# Patient Record
Sex: FEMALE | Race: WHITE | NOT HISPANIC OR LATINO | Employment: FULL TIME | URBAN - METROPOLITAN AREA
[De-identification: names, ages, dates, MRNs, and addresses within clinical notes are randomized per-mention and may not be internally consistent; named-entity substitution may affect disease eponyms.]

---

## 2023-11-29 ENCOUNTER — OFFICE VISIT (OUTPATIENT)
Dept: INTERNAL MEDICINE | Facility: CLINIC | Age: 33
End: 2023-11-29
Payer: COMMERCIAL

## 2023-11-29 VITALS
DIASTOLIC BLOOD PRESSURE: 74 MMHG | SYSTOLIC BLOOD PRESSURE: 102 MMHG | WEIGHT: 171 LBS | HEIGHT: 61 IN | BODY MASS INDEX: 32.28 KG/M2 | OXYGEN SATURATION: 96 % | TEMPERATURE: 98.4 F | HEART RATE: 65 BPM

## 2023-11-29 DIAGNOSIS — Z00.00 ANNUAL PHYSICAL EXAM: Primary | ICD-10-CM

## 2023-11-29 DIAGNOSIS — F41.9 ANXIETY: ICD-10-CM

## 2023-11-29 DIAGNOSIS — R79.89 ELEVATED TSH: ICD-10-CM

## 2023-11-29 DIAGNOSIS — Z83.438 FAMILY HISTORY OF HYPERLIPIDEMIA: ICD-10-CM

## 2023-11-29 DIAGNOSIS — R10.13 DYSPEPSIA: ICD-10-CM

## 2023-11-29 NOTE — PROGRESS NOTES
Chief Complaint  Establish Care and Annual Exam    Subjective        Ivonne Cross presents to [facility] PRIMARY [facility]  History of Present Illness  Here to establish care and for annual exam.    Last annual exam we have in EHR is from June 20, 2021 where she was followingDr. Loi Mac, primary care provider, with [facility].  She reported some anxiety and insomnia.  She was prescribed hydroxyzine (Atarax) 25 mg p.o. nightly as needed.  Last lab work we have in the EHR is from June 28, 2021.  TSH was elevated, at 6.080, lipid panel showed only a mild increase in the LDL cholesterol, at 111 mg/dL, and her CBC and CMP were unremarkable.    Anxiety: She describes herself as very type-A, was very anxious at her last office visit in 2021. Feels that she is in a good spot right now. Sleeps well most nights, but recently has had intermittent difficulty with sleeping along with unspecified abdominal discomfort and diarrhea.  She vomited once or twice weeks ago and her bowel pattern has returned to normal. She and her  are trying to have a baby, and has been trying for almost 1 year. She stopped taking oral contraceptives in January 2023 and this has been stressful for her and her .    Obesity: family history of obesity with several family members, especially women, who have had weight-loss surgery without a lot of benefence. She works for [employer] and now works out at [employer] 3-5 times per week, watching her diet. She has tried kcal tracking on an virgilio, but always felt hungry. Her highest weight has been 180 lbs. She has gained weight in the past 2 years due to working on her Masters degree. Was very sedentary during that time.  When her TSH came back elevated in June 2021, it was never rechecked and she did not have further evaluation for hypothyroid disease.She denies any unusual fatigue, hair loss, skin changes.    She is up-to-date on her Pap, sees her Jacqueline Kimbrough, with total  "woman.  Last well woman exam was November 27, 2023    She reports a family history of chronic obstructive pulmonary disease (mother), hyperlipidemia (father and paternal grandmother).       Objective   Vital Signs:  /74 (BP Location: Left arm, Patient Position: Sitting, Cuff Size: Adult)   Pulse 65   Temp 98.4 °F (36.9 °C) (Infrared)   Ht 154.9 cm (61\")   Wt 77.6 kg (171 lb)   SpO2 96%   BMI 32.31 kg/m²   Estimated body mass index is 32.31 kg/m² as calculated from the following:    Height as of this encounter: 154.9 cm (61\").    Weight as of this encounter: 77.6 kg (171 lb).       BMI is >= 30 and <35. (Class 1 Obesity). The following options were offered after discussion;: exercise counseling/recommendations, nutrition counseling/recommendations, pharmacological intervention options, and referral to a nutritionist      Physical Exam  Vitals reviewed.   Constitutional:       General: She is not in acute distress.     Appearance: Normal appearance. She is obese. She is not ill-appearing, toxic-appearing or diaphoretic.   HENT:      Head: Normocephalic and atraumatic. Hair is normal.      Right Ear: Tympanic membrane, ear canal and external ear normal.      Left Ear: Tympanic membrane, ear canal and external ear normal.      Nose: Nose normal.      Mouth/Throat:      Mouth: Mucous membranes are moist.      Pharynx: Oropharynx is clear.   Eyes:      Extraocular Movements: Extraocular movements intact.      Conjunctiva/sclera: Conjunctivae normal.      Pupils: Pupils are equal, round, and reactive to light.   Cardiovascular:      Rate and Rhythm: Normal rate and regular rhythm.      Pulses: Normal pulses.      Heart sounds: Normal heart sounds.   Pulmonary:      Effort: Pulmonary effort is normal.   Abdominal:      General: Bowel sounds are normal.      Palpations: Abdomen is soft.      Tenderness: There is no abdominal tenderness.   Musculoskeletal:         General: Normal range of motion.      Cervical " back: Normal range of motion and neck supple. No tenderness.      Right lower leg: No edema.      Left lower leg: No edema.   Lymphadenopathy:      Cervical: No cervical adenopathy.   Skin:     General: Skin is warm.      Capillary Refill: Capillary refill takes less than 2 seconds.   Neurological:      General: No focal deficit present.      Mental Status: She is alert and oriented to person, place, and time. Mental status is at baseline.      Motor: No weakness.      Coordination: Coordination normal.      Gait: Gait normal.      Deep Tendon Reflexes: Reflexes normal.   Psychiatric:         Mood and Affect: Mood normal.         Behavior: Behavior normal.         Thought Content: Thought content normal.         Judgment: Judgment normal.        Result Review :                   Assessment and Plan   Patient is a very pleasant 32-year-old female with no prior remarkable medical history here to establish care and for annual exam.    Diagnoses and all orders for this visit:    1. Annual physical exam (Primary)  -     CBC & Differential  -     Comprehensive Metabolic Panel    2. Family history of hyperlipidemia  -     Lipid Panel    3. Elevated TSH  -     Thyroid Panel With TSH    4. BMI 32.0-32.9,adult  -     Hemoglobin A1c  Discussed various weight loss strategies, including medicinal therapy.  She wants to lose weight with lifestyle modifications before considering medicinal therapy.  She is also trying to get pregnant and does not want to start any medication for weight loss at this time.  We discussed the mechanisms of Wegovy, pros and cons.  Also discussed the Mediterranean diet and signed paper for [employer] employee health-incentive program, [employer]. We will continue to monitor her progress.   5. Anxiety  She feels she is in a pretty good place with anxiety.  She does not wish to start any type of medication, but she does know that if anxiety feels that is not well-controlled can discuss options in the future  including medicinal and cognitive behavioral therapy.  Because she is trying to get pregnant, advised her to keep an open communication with her obstetrician if while pregnant she does have exacerbation of her anxiety.  6. Dyspepsia  This is a recent issue within the last 2 weeks, intermittent. We will continue to monitor for chronicity. If lab work indicates liver issue, will have her follow up for further lab work and possible referral to GI.            Follow Up   Return in about 1 year (around 11/29/2024) for Annual physical.  Patient was given instructions and counseling regarding her condition or for health maintenance advice. Please see specific information pulled into the AVS if appropriate.

## 2023-11-30 ENCOUNTER — PATIENT ROUNDING (BHMG ONLY) (OUTPATIENT)
Dept: INTERNAL MEDICINE | Facility: CLINIC | Age: 33
End: 2023-11-30
Payer: COMMERCIAL

## 2023-11-30 LAB
ALBUMIN SERPL-MCNC: 4.3 G/DL (ref 3.9–4.9)
ALBUMIN/GLOB SERPL: 1.5 {RATIO} (ref 1.2–2.2)
ALP SERPL-CCNC: 113 IU/L (ref 44–121)
ALT SERPL-CCNC: 17 IU/L (ref 0–32)
AST SERPL-CCNC: 17 IU/L (ref 0–40)
BASOPHILS # BLD AUTO: 0.1 X10E3/UL (ref 0–0.2)
BASOPHILS NFR BLD AUTO: 1 %
BILIRUB SERPL-MCNC: 0.2 MG/DL (ref 0–1.2)
BUN SERPL-MCNC: 11 MG/DL (ref 6–20)
BUN/CREAT SERPL: 15 (ref 9–23)
CALCIUM SERPL-MCNC: 9.2 MG/DL (ref 8.7–10.2)
CHLORIDE SERPL-SCNC: 104 MMOL/L (ref 96–106)
CHOLEST SERPL-MCNC: 157 MG/DL (ref 100–199)
CO2 SERPL-SCNC: 22 MMOL/L (ref 20–29)
CREAT SERPL-MCNC: 0.75 MG/DL (ref 0.57–1)
EGFRCR SERPLBLD CKD-EPI 2021: 109 ML/MIN/1.73
EOSINOPHIL # BLD AUTO: 0.2 X10E3/UL (ref 0–0.4)
EOSINOPHIL NFR BLD AUTO: 2 %
ERYTHROCYTE [DISTWIDTH] IN BLOOD BY AUTOMATED COUNT: 11.9 % (ref 11.7–15.4)
FT4I SERPL CALC-MCNC: 1.6 (ref 1.2–4.9)
GLOBULIN SER CALC-MCNC: 2.8 G/DL (ref 1.5–4.5)
GLUCOSE SERPL-MCNC: 87 MG/DL (ref 70–99)
HBA1C MFR BLD: 5.7 % (ref 4.8–5.6)
HCT VFR BLD AUTO: 41.5 % (ref 34–46.6)
HDLC SERPL-MCNC: 53 MG/DL
HGB BLD-MCNC: 13.9 G/DL (ref 11.1–15.9)
IMM GRANULOCYTES # BLD AUTO: 0 X10E3/UL (ref 0–0.1)
IMM GRANULOCYTES NFR BLD AUTO: 0 %
LDLC SERPL CALC-MCNC: 91 MG/DL (ref 0–99)
LYMPHOCYTES # BLD AUTO: 2.5 X10E3/UL (ref 0.7–3.1)
LYMPHOCYTES NFR BLD AUTO: 36 %
MCH RBC QN AUTO: 29 PG (ref 26.6–33)
MCHC RBC AUTO-ENTMCNC: 33.5 G/DL (ref 31.5–35.7)
MCV RBC AUTO: 87 FL (ref 79–97)
MONOCYTES # BLD AUTO: 0.6 X10E3/UL (ref 0.1–0.9)
MONOCYTES NFR BLD AUTO: 8 %
NEUTROPHILS # BLD AUTO: 3.8 X10E3/UL (ref 1.4–7)
NEUTROPHILS NFR BLD AUTO: 53 %
PLATELET # BLD AUTO: 286 X10E3/UL (ref 150–450)
POTASSIUM SERPL-SCNC: 3.9 MMOL/L (ref 3.5–5.2)
PROT SERPL-MCNC: 7.1 G/DL (ref 6–8.5)
RBC # BLD AUTO: 4.8 X10E6/UL (ref 3.77–5.28)
SODIUM SERPL-SCNC: 141 MMOL/L (ref 134–144)
T3RU NFR SERPL: 24 % (ref 24–39)
T4 SERPL-MCNC: 6.8 UG/DL (ref 4.5–12)
TRIGL SERPL-MCNC: 64 MG/DL (ref 0–149)
TSH SERPL DL<=0.005 MIU/L-ACNC: 3.69 UIU/ML (ref 0.45–4.5)
VLDLC SERPL CALC-MCNC: 13 MG/DL (ref 5–40)
WBC # BLD AUTO: 7.1 X10E3/UL (ref 3.4–10.8)

## 2024-02-21 ENCOUNTER — OFFICE VISIT (OUTPATIENT)
Dept: INTERNAL MEDICINE | Facility: CLINIC | Age: 34
End: 2024-02-21
Payer: COMMERCIAL

## 2024-02-21 VITALS
WEIGHT: 173 LBS | DIASTOLIC BLOOD PRESSURE: 74 MMHG | SYSTOLIC BLOOD PRESSURE: 120 MMHG | RESPIRATION RATE: 16 BRPM | OXYGEN SATURATION: 99 % | TEMPERATURE: 97.8 F | HEIGHT: 60 IN | HEART RATE: 71 BPM | BODY MASS INDEX: 33.96 KG/M2

## 2024-02-21 DIAGNOSIS — Z32.00 ENCOUNTER FOR PREGNANCY TEST, RESULT UNKNOWN: ICD-10-CM

## 2024-02-21 DIAGNOSIS — H61.23 IMPACTED CERUMEN OF BOTH EARS: ICD-10-CM

## 2024-02-21 DIAGNOSIS — R05.3 CHRONIC COUGH: Primary | ICD-10-CM

## 2024-02-21 LAB
B-HCG UR QL: NEGATIVE
EXPIRATION DATE: NORMAL
INTERNAL NEGATIVE CONTROL: NORMAL
INTERNAL POSITIVE CONTROL: NORMAL
Lab: NORMAL

## 2024-02-21 RX ORDER — DOXYCYCLINE HYCLATE 100 MG/1
100 CAPSULE ORAL 2 TIMES DAILY
Qty: 14 CAPSULE | Refills: 0 | Status: SHIPPED | OUTPATIENT
Start: 2024-02-21 | End: 2024-02-28

## 2024-02-21 RX ORDER — METHYLPREDNISOLONE 4 MG/1
TABLET ORAL
Qty: 21 TABLET | Refills: 0 | Status: SHIPPED | OUTPATIENT
Start: 2024-02-21

## 2024-02-21 NOTE — PROGRESS NOTES
"Chief Complaint  Cough (Pt was sick in January, and has had a cough for about 3 weeks. Pt states that she just wanted someone to listen to her lungs to make sure no fluid on lungs. No productive, pt states it is a dry cough.)    Subjective        Ivonne Cross presents to [facility]  History of Present Illness  She was last seen in our office on November 29, 2023 to establish care and for annual physical exam.    She has been having a linger cough for about 2 months, dry, intermittent. Not associated with physical activity. She has tried Mucinex, not helping. Her grandmother was diagnosed with a rhinovirus, and was around her quite a bit. Grandmother was hospitalized for viral URI.      She is trying to get pregnant and is due her period at this time. She has never been diagnosed with asthma, she has never been a smoker. But, she is exposed to second-hand smoke via her  and his mother.  smokes outside, but does not necessarily wear a cover to decrease allergens brought inside. No one at home has tested positive for COVID-19.    No acid reflux, no post nasal drainage, no dysphagia or chronic throat clearing.           Cough  This is a new problem. The current episode started more than 1 month ago. The problem has been unchanged. The problem occurs intermittent. The cough is Dry. Pertinent negatives include no chest pain, chills, ear pain, fever, heartburn, hemoptysis, nasal congestion, postnasal drip, rhinorrhea, shortness of breath, sweats or weight loss. The symptoms are aggravated by stress. The treatment provided no relief. seasonal allergies       Objective   Vital Signs:  /74 (BP Location: Right arm, Patient Position: Sitting, Cuff Size: Adult)   Pulse 71   Temp 97.8 °F (36.6 °C) (Infrared)   Resp 16   Ht 152.4 cm (60\")   Wt 78.5 kg (173 lb)   SpO2 99%   BMI 33.79 kg/m²   Estimated body mass index is 33.79 kg/m² as calculated from the following:    " "Height as of this encounter: 152.4 cm (60\").    Weight as of this encounter: 78.5 kg (173 lb).               Physical Exam  Vitals reviewed.   Constitutional:       General: She is not in acute distress.     Appearance: Normal appearance. She is obese. She is not ill-appearing, toxic-appearing or diaphoretic.   HENT:      Right Ear: Ear canal and external ear normal. There is impacted cerumen.      Left Ear: Ear canal and external ear normal. There is impacted cerumen.      Mouth/Throat:      Mouth: Mucous membranes are moist.      Pharynx: Oropharynx is clear. No oropharyngeal exudate or posterior oropharyngeal erythema.   Cardiovascular:      Rate and Rhythm: Normal rate and regular rhythm.      Pulses: Normal pulses.      Heart sounds: Normal heart sounds.   Pulmonary:      Effort: Pulmonary effort is normal.      Breath sounds: Normal breath sounds.   Skin:     General: Skin is warm and dry.   Neurological:      General: No focal deficit present.      Mental Status: She is alert and oriented to person, place, and time. Mental status is at baseline.   Psychiatric:         Mood and Affect: Mood normal.         Behavior: Behavior normal.         Thought Content: Thought content normal.         Judgment: Judgment normal.        Result Review :                   Assessment and Plan   Patient is a very pleasant 33-year-old female with no prior remarkable medical history here with complaint chronic cough for about 2 months, stable but lingering and not responding to OTC medication.     Results for orders placed or performed in visit on 02/21/24   POC Pregnancy, Urine    Specimen: Urine   Result Value Ref Range    HCG, Urine, QL Negative Negative    Lot Number [id number]     Internal Positive Control Passed Positive, Passed    Internal Negative Control Passed Negative, Passed    Expiration Date 12,142,024              Diagnoses and all orders for this visit:    1. Chronic cough (Primary)  Comments:  If no response to " medication, will order chest x-ray with possible PFTs this continues. She does not wish to have an inhaler right now.  Orders:  -     doxycycline (VIBRAMYCIN) 100 MG capsule; Take 1 capsule by mouth 2 (Two) Times a Day for 7 days.  Dispense: 14 capsule; Refill: 0  -     methylPREDNISolone (MEDROL) 4 MG dose pack; Take as directed on package instructions.  Dispense: 21 tablet; Refill: 0    2. Encounter for pregnancy test, result unknown  Comments:  Negative  Orders:  -     POC Pregnancy, Urine    3. Impacted cerumen of both ears  Comments:  Use over-the-counter ear wax removal kit.             Follow Up   Return if symptoms worsen or fail to improve.  Patient was given instructions and counseling regarding her condition or for health maintenance advice. Please see specific information pulled into the AVS if appropriate.         Answers submitted by the patient for this visit:  Primary Reason for Visit (Submitted on 2/19/2024)  What is the primary reason for your visit?: Cough

## 2024-09-30 ENCOUNTER — OFFICE VISIT (OUTPATIENT)
Dept: INTERNAL MEDICINE | Facility: CLINIC | Age: 34
End: 2024-09-30
Payer: COMMERCIAL

## 2024-09-30 VITALS
WEIGHT: 191 LBS | HEART RATE: 91 BPM | OXYGEN SATURATION: 98 % | DIASTOLIC BLOOD PRESSURE: 76 MMHG | SYSTOLIC BLOOD PRESSURE: 120 MMHG | BODY MASS INDEX: 37.5 KG/M2 | HEIGHT: 60 IN

## 2024-09-30 DIAGNOSIS — Z00.8 ENCOUNTER FOR BIOMETRIC SCREENING: Primary | ICD-10-CM

## 2024-09-30 PROCEDURE — 99213 OFFICE O/P EST LOW 20 MIN: CPT | Performed by: NURSE PRACTITIONER

## 2024-09-30 RX ORDER — LORATADINE 10 MG/1
TABLET ORAL
COMMUNITY

## 2024-09-30 RX ORDER — ASPIRIN 81 MG/1
81 TABLET ORAL DAILY
COMMUNITY
Start: 2024-04-11 | End: 2025-04-11

## 2024-09-30 NOTE — PROGRESS NOTES
"Chief Complaint  Biometric Screening (Biometric screening for employer. /)    Subjective        Ivonne Cross presents to [facility]  History of Present Illness  She was seen in our office on 11/29/2023 for routine physical exam.     She is currently 35 weeks gestation. Does not report any concerning symptoms. Paperwork provided to complete when lab results are known to be signed and faxed to [facility] for routine wellness screening.       Objective   Vital Signs:  /76   Pulse 91   Ht 152.4 cm (60\")   Wt 86.6 kg (191 lb)   SpO2 98%   BMI 37.30 kg/m²   Estimated body mass index is 37.3 kg/m² as calculated from the following:    Height as of this encounter: 152.4 cm (60\").    Weight as of this encounter: 86.6 kg (191 lb).               Physical Exam  Vitals and nursing note reviewed.   Constitutional:       General: She is not in acute distress.     Appearance: Normal appearance. She is not ill-appearing, toxic-appearing or diaphoretic.   Cardiovascular:      Rate and Rhythm: Normal rate and regular rhythm.      Pulses: Normal pulses.      Heart sounds: Normal heart sounds.   Pulmonary:      Effort: Pulmonary effort is normal.      Breath sounds: Normal breath sounds.   Neurological:      General: No focal deficit present.      Mental Status: She is alert and oriented to person, place, and time. Mental status is at baseline.   Psychiatric:         Mood and Affect: Mood normal.         Behavior: Behavior normal.         Thought Content: Thought content normal.         Judgment: Judgment normal.        Result Review :                   Assessment and Plan   Patient is a very pleasant 33-year-old female with no prior remarkable medical history her for biometric lab work and screening for routine surveillance for employer, currently 38 week pregnant.           Diagnoses and all orders for this visit:    1. Encounter for biometric screening (Primary)  -     Hemoglobin A1c  -     Lipid " Panel    Follow up post partum after released from women's health provider.          Follow Up   No follow-ups on file.  Patient was given instructions and counseling regarding her condition or for health maintenance advice. Please see specific information pulled into the AVS if appropriate.

## 2024-10-01 LAB
CHOLEST SERPL-MCNC: 304 MG/DL (ref 100–199)
HBA1C MFR BLD: 5.8 % (ref 4.8–5.6)
HDLC SERPL-MCNC: 86 MG/DL
LDL CALC COMMENT:: ABNORMAL
LDLC SERPL CALC-MCNC: 158 MG/DL (ref 0–99)
TRIGL SERPL-MCNC: 326 MG/DL (ref 0–149)
VLDLC SERPL CALC-MCNC: 60 MG/DL (ref 5–40)

## 2024-11-07 ENCOUNTER — TELEPHONE (OUTPATIENT)
Dept: INTERNAL MEDICINE | Facility: CLINIC | Age: 34
End: 2024-11-07

## 2024-11-07 NOTE — TELEPHONE ENCOUNTER
Caller: Ivonne Cross    Relationship: Self    Best call back number: [phone]     What form or medical record are you requesting: BIOMETRICS RESULT FORM    Who is requesting this form or medical record from you: [facility]    How would you like to receive the form or medical records (pick-up, mail, fax): FAX  If fax, what is the fax number: [phone]    Timeframe paperwork needed: ASAP    Additional notes: PLEASE REFAX

## 2024-11-20 ENCOUNTER — TELEPHONE (OUTPATIENT)
Dept: INTERNAL MEDICINE | Facility: CLINIC | Age: 34
End: 2024-11-20
Payer: COMMERCIAL

## 2024-11-20 NOTE — TELEPHONE ENCOUNTER
Caller: Ivonne Cross    Relationship to patient: Self    Best call back number: [phone]     Patient is needing: NEEDING TO  A COPY OF BIOMETRICS PAPERWORK HER  WILL  FROM OFFICE

## 2024-11-25 ENCOUNTER — TELEPHONE (OUTPATIENT)
Dept: INTERNAL MEDICINE | Facility: CLINIC | Age: 34
End: 2024-11-25
Payer: COMMERCIAL

## 2024-11-25 NOTE — TELEPHONE ENCOUNTER
Caller: Ivonne Cross    Relationship: Self    Best call back number: [phone]       Who are you requesting to speak with (clinical staff, provider,  specific staff member): PCP OR CLINICAL        What was the call regarding: PATIENT GOT BIOMETRIC SCREENING IN SEPTEMBER BUT IT IS MISSING MEGHAN'S SIGNATURE AND GLUCOSE NUMBER.    MEGHAN'S NAME IS PRINTED BUT HER SIGNATURE IS NOT ON IT AND [facility] IS ASKING FOR THAT.  PLEASE CALL PATIENT AS SOON AS THIS HAS BEEN PRINTED AND UPDATED AND SHE WILL COME PICK IT UP.    SHE HAS TO TURN IT IN BY THE END OF NOVEMBER.

## 2025-01-03 ENCOUNTER — TELEPHONE (OUTPATIENT)
Dept: INTERNAL MEDICINE | Facility: CLINIC | Age: 35
End: 2025-01-03

## 2025-01-03 NOTE — TELEPHONE ENCOUNTER
Caller: Ivonne Cross    Relationship: Self    Best call back number: [phone]    What orders are you requesting (i.e. lab or imaging): URINALYSIS TO CHECK FOR A UTI    In what timeframe would the patient need to come in: ASAP    Where will you receive your lab/imaging services: [facility]    Additional notes: PATIENT IS HAVING CLOUDY URINE, PAIN WHILE URINATING, SMELL TO URINE    PATIENT ASKS THAT IF AN ANTIBIOTIC IS CALLED IN TO PLEASE KEEP IN MIND THAT SHE IS BREASTFEEDING.